# Patient Record
Sex: MALE | URBAN - METROPOLITAN AREA
[De-identification: names, ages, dates, MRNs, and addresses within clinical notes are randomized per-mention and may not be internally consistent; named-entity substitution may affect disease eponyms.]

---

## 2021-08-19 ENCOUNTER — OFFICE VISIT (OUTPATIENT)
Dept: URBAN - METROPOLITAN AREA CLINIC 71 | Facility: CLINIC | Age: 66
End: 2021-08-19
Payer: COMMERCIAL

## 2021-08-19 DIAGNOSIS — H44.23 DEGENERATIVE MYOPIA, BILATERAL: ICD-10-CM

## 2021-08-19 DIAGNOSIS — H52.4 PRESBYOPIA: Primary | ICD-10-CM

## 2021-08-19 PROCEDURE — 92014 COMPRE OPH EXAM EST PT 1/>: CPT | Performed by: OPTOMETRIST

## 2021-08-19 ASSESSMENT — VISUAL ACUITY
OS: 20/20
OD: 20/20

## 2021-08-19 ASSESSMENT — INTRAOCULAR PRESSURE
OS: 16
OD: 16

## 2021-08-19 NOTE — IMPRESSION/PLAN
Impression: Presbyopia: H52.4. Plan: Presbyopia is the inability to focus on objects (ie: accommodate) due to the loss of flexibility of your natural lens. Presbyopia occurs with age. Reading glasses, bifocals, trifocals or contacts can be helpful. Contact the office if difficulty focusing persists despite corrective eye wear. New glasses RX given today for progressives. Discussed the change in prescription compared to the old RX and that his acuity is better in the new prescription.

## 2021-08-19 NOTE — IMPRESSION/PLAN
Impression: Degenerative myopia, bilateral: H44.23. Plan: retina appears stable on undilated exam today. Recommend PT RTC N/A for DFE w/ OPTOS. PT wants to wait until next year for when he gets on medicare. discussed the S/S of a RD and if any of these symptoms arise he is to RTC ASAP for evaluation.

## 2025-04-29 ENCOUNTER — OFFICE VISIT (OUTPATIENT)
Dept: URBAN - METROPOLITAN AREA CLINIC 71 | Facility: CLINIC | Age: 70
End: 2025-04-29
Payer: MEDICARE

## 2025-04-29 DIAGNOSIS — H52.13 MYOPIA, BILATERAL: ICD-10-CM

## 2025-04-29 DIAGNOSIS — H25.13 AGE-RELATED NUCLEAR CATARACT, BILATERAL: Primary | ICD-10-CM

## 2025-04-29 DIAGNOSIS — H44.23 DEGENERATIVE MYOPIA, BILATERAL: ICD-10-CM

## 2025-04-29 PROCEDURE — 99203 OFFICE O/P NEW LOW 30 MIN: CPT

## 2025-04-29 ASSESSMENT — VISUAL ACUITY
OS: 20/20
OD: 20/20

## 2025-04-29 ASSESSMENT — INTRAOCULAR PRESSURE
OS: 17
OD: 18